# Patient Record
Sex: MALE | Race: WHITE | Employment: OTHER | URBAN - METROPOLITAN AREA
[De-identification: names, ages, dates, MRNs, and addresses within clinical notes are randomized per-mention and may not be internally consistent; named-entity substitution may affect disease eponyms.]

---

## 2017-08-06 ENCOUNTER — HOSPITAL ENCOUNTER (EMERGENCY)
Age: 42
Discharge: HOME | End: 2017-08-06
Attending: INTERNAL MEDICINE | Admitting: INTERNAL MEDICINE
Payer: OTHER GOVERNMENT

## 2017-08-06 ENCOUNTER — APPOINTMENT (OUTPATIENT)
Dept: GENERAL RADIOLOGY | Age: 42
End: 2017-08-06
Attending: INTERNAL MEDICINE
Payer: OTHER GOVERNMENT

## 2017-08-06 VITALS
HEART RATE: 87 BPM | RESPIRATION RATE: 20 BRPM | TEMPERATURE: 98 F | BODY MASS INDEX: 28.7 KG/M2 | OXYGEN SATURATION: 97 % | WEIGHT: 205 LBS | HEIGHT: 71 IN | DIASTOLIC BLOOD PRESSURE: 86 MMHG | SYSTOLIC BLOOD PRESSURE: 145 MMHG

## 2017-08-06 DIAGNOSIS — M25.511 ACUTE PAIN OF RIGHT SHOULDER: ICD-10-CM

## 2017-08-06 DIAGNOSIS — S49.91XA RIGHT SHOULDER INJURY, INITIAL ENCOUNTER: Primary | ICD-10-CM

## 2017-08-06 PROCEDURE — 74011250637 HC RX REV CODE- 250/637: Performed by: PHYSICIAN ASSISTANT

## 2017-08-06 PROCEDURE — 96372 THER/PROPH/DIAG INJ SC/IM: CPT

## 2017-08-06 PROCEDURE — 72040 X-RAY EXAM NECK SPINE 2-3 VW: CPT

## 2017-08-06 PROCEDURE — 99283 EMERGENCY DEPT VISIT LOW MDM: CPT

## 2017-08-06 PROCEDURE — 74011250636 HC RX REV CODE- 250/636: Performed by: PHYSICIAN ASSISTANT

## 2017-08-06 PROCEDURE — 73030 X-RAY EXAM OF SHOULDER: CPT

## 2017-08-06 PROCEDURE — L3670 SO ACRO/CLAV CAN WEB PRE OTS: HCPCS

## 2017-08-06 RX ORDER — HYDROCODONE BITARTRATE AND ACETAMINOPHEN 10; 325 MG/1; MG/1
TABLET ORAL
Qty: 20 TAB | Refills: 0 | Status: SHIPPED | OUTPATIENT
Start: 2017-08-06

## 2017-08-06 RX ORDER — MORPHINE SULFATE 2 MG/ML
2 INJECTION, SOLUTION INTRAMUSCULAR; INTRAVENOUS
Status: COMPLETED | OUTPATIENT
Start: 2017-08-06 | End: 2017-08-06

## 2017-08-06 RX ORDER — ONDANSETRON 4 MG/1
4 TABLET, ORALLY DISINTEGRATING ORAL
Status: COMPLETED | OUTPATIENT
Start: 2017-08-06 | End: 2017-08-06

## 2017-08-06 RX ADMIN — MORPHINE SULFATE 2 MG: 2 INJECTION, SOLUTION INTRAMUSCULAR; INTRAVENOUS at 14:38

## 2017-08-06 RX ADMIN — ONDANSETRON 4 MG: 4 TABLET, ORALLY DISINTEGRATING ORAL at 14:01

## 2017-08-06 NOTE — DISCHARGE INSTRUCTIONS
Shoulder Pain: Care Instructions  Your Care Instructions    You can hurt your shoulder by using it too much during an activity, such as fishing or baseball. It can also happen as part of the everyday wear and tear of getting older. Shoulder injuries can be slow to heal, but your shoulder should get better with time. Your doctor may recommend a sling to rest your shoulder. If you have injured your shoulder, you may need testing and treatment. Follow-up care is a key part of your treatment and safety. Be sure to make and go to all appointments, and call your doctor if you are having problems. It's also a good idea to know your test results and keep a list of the medicines you take. How can you care for yourself at home? · Take pain medicines exactly as directed. ¨ If the doctor gave you a prescription medicine for pain, take it as prescribed. ¨ If you are not taking a prescription pain medicine, ask your doctor if you can take an over-the-counter medicine. ¨ Do not take two or more pain medicines at the same time unless the doctor told you to. Many pain medicines contain acetaminophen, which is Tylenol. Too much acetaminophen (Tylenol) can be harmful. · If your doctor recommends that you wear a sling, use it as directed. Do not take it off before your doctor tells you to. · Put ice or a cold pack on the sore area for 10 to 20 minutes at a time. Put a thin cloth between the ice and your skin. · If there is no swelling, you can put moist heat, a heating pad, or a warm cloth on your shoulder. Some doctors suggest alternating between hot and cold. · Rest your shoulder for a few days. If your doctor recommends it, you can then begin gentle exercise of the shoulder, but do not lift anything heavy. When should you call for help? Call 911 anytime you think you may need emergency care. For example, call if:  · You have chest pain or pressure. This may occur with:  ¨ Sweating. ¨ Shortness of breath.   ¨ Nausea or vomiting. ¨ Pain that spreads from the chest to the neck, jaw, or one or both shoulders or arms. ¨ Dizziness or lightheadedness. ¨ A fast or uneven pulse. After calling 911, chew 1 adult-strength aspirin. Wait for an ambulance. Do not try to drive yourself. · Your arm or hand is cool or pale or changes color. Call your doctor now or seek immediate medical care if:  · You have signs of infection, such as:  ¨ Increased pain, swelling, warmth, or redness in your shoulder. ¨ Red streaks leading from a place on your shoulder. ¨ Pus draining from an area of your shoulder. ¨ Swollen lymph nodes in your neck, armpits, or groin. ¨ A fever. Watch closely for changes in your health, and be sure to contact your doctor if:  · You cannot use your shoulder. · Your shoulder does not get better as expected. Where can you learn more? Go to http://tom-bordy.info/. Enter Y469 in the search box to learn more about \"Shoulder Pain: Care Instructions. \"  Current as of: March 21, 2017  Content Version: 11.3  © 7541-8201 [employer]. Care instructions adapted under license by [employer] (which disclaims liability or warranty for this information). If you have questions about a medical condition or this instruction, always ask your healthcare professional. [address] any warranty or liability for your use of this information.

## 2017-08-06 NOTE — ED PROVIDER NOTES
[address]  EMERGENCY DEPARTMENT HISTORY AND PHYSICAL EXAM       Date: 8/6/2017   Patient Name: Adelia Mullins   YOB: 1975  Medical Record Number: [id number]    History of Presenting Illness     Chief Complaint   Patient presents with    Fall    Shoulder Injury        History Provided By:  patient    Additional History: 1:56 PM  Adelia Mullins is a 46 y.o. male presenting to the ED C/O throbbing right shoulder pain radiating down his arm s/p fall onset 1 hour ago. Associated sxs include right shoulder tingling and numbness. Pt states he fell with his right arm extended in front of him and when he landed on it he heard a \"pop\". Pt denies any other symptoms or complaints at this time. Primary Care Provider: Codi Smith MD   Specialist:    Past History     Past Medical History:   History reviewed. No pertinent past medical history. Past Surgical History:   Past Surgical History:   Procedure Laterality Date    HX ORTHOPAEDIC      ankle repair        Family History:   History reviewed. No pertinent family history. Social History:   Social History   Substance Use Topics    Smoking status: Never Smoker    Smokeless tobacco: None    Alcohol use Yes        Allergies: Allergies   Allergen Reactions    Avocado Hives    Shellfish Derived Hives        Review of Systems   Review of Systems   Constitutional: Negative for chills and fever. Musculoskeletal: Positive for arthralgias (right shoulder). Neurological: Positive for weakness (due to pain) and numbness (tingling). All other systems reviewed and are negative. Physical Exam  There were no vitals filed for this visit. Physical Exam   Constitutional: He is oriented to person, place, and time. Vital signs are normal. He appears well-developed and well-nourished. No distress. HENT:   Head: Normocephalic and atraumatic.    Eyes: Conjunctivae and EOM are normal. Pupils are equal, round, and reactive to light.   Neck: Normal range of motion. Neck supple. Cardiovascular: Normal rate, regular rhythm and normal heart sounds. Pulmonary/Chest: Effort normal and breath sounds normal. No respiratory distress. Abdominal: Soft. Bowel sounds are normal.   Musculoskeletal:        Right shoulder: He exhibits decreased range of motion, tenderness and bony tenderness (right upper scapula). He exhibits no deformity.  strength is normal and he does have a radial pulse. Full strength although limited by pain. Neurological: He is alert and oriented to person, place, and time. Skin: Skin is warm and dry. He is not diaphoretic. Psychiatric: He has a normal mood and affect. His behavior is normal.   Nursing note and vitals reviewed. Diagnostic Study Results     Labs -    No results found for this or any previous visit (from the past 12 hour(s)). Radiologic Studies -  The following have been ordered and reviewed:  XR SPINE CERV TRAUMA 3 V MAX   Final Result   IMPRESSION:  1. Mild cervical spondylosis as above without evidence of acute malalignment or  abnormal prevertebral soft tissue swelling  As read by the radiologist.     XR SHOULDER RT AP/LAT MIN 2 V   Final Result   IMPRESSION:  1. No acute osseous abnormality involving the right shoulder. As read by the radiologist.       Medical Decision Making   I am the first provider for this patient. I reviewed the vital signs, available nursing notes, past medical history, past surgical history, family history and social history. Vital Signs-Reviewed the patient's vital signs. No data found. Pulse Oximetry Analysis - Normal 100% on RA         Old Medical Records: Nursing notes. Procedures:   Procedures    ED Course:  1:56 PM  Initial assessment performed. The patients presenting problems have been discussed, and they are in agreement with the care plan formulated and outlined with them.   I have encouraged them to ask questions as they arise throughout their visit. Medications Given in the ED:  Medications   morphine injection 2 mg (2 mg IntraMUSCular Given 8/6/17 2838)   ondansetron (ZOFRAN ODT) tablet 4 mg (4 mg Oral Given 8/6/17 1401)       Discharge Note:  3:39 PM  Pt has been reexamined. Patient has no new complaints, changes, or physical findings. Care plan outlined and precautions discussed. Results were reviewed with the patient. All medications were reviewed with the patient; will d/c home with [facility] for pain. All of pt's questions and concerns were addressed. Patient was instructed and agrees to follow up with [facility], as well as to return to the ED upon further deterioration. Patient is ready to go home. Diagnosis   Clinical Impression:   1. Right shoulder injury, initial encounter    2. Acute pain of right shoulder           Follow-up Information     Follow up With Details Comments [address] Schedule an appointment as soon as possible for a visit in 2 days For orthopedic follow up [facility] [address] [place] [address]    [facility] EMERGENCY DEPT Go to As needed, If symptoms worsen [address] [place] [address]  [phone]          Current Discharge Medication List      START taking these medications    Details   HYDROcodone-acetaminophen (NORCO)  mg tablet Take half to one tab by mouth PRN pain. Qty: 20 Tab, Refills: 0             _______________________________   Attestations: This note is prepared by Princess Woo, acting as a Scribe for [employer], PA-C. on 1:49 PM on 8/6/2017 . [employer], PA-C.: The scribe's documentation has been prepared under my direction and personally reviewed by me in its entirety.   _______________________________

## 2017-08-06 NOTE — LETTER
[facility] [place] 
[facility] 
[address] [id number] 
[phone] Work/School Note Date: 8/6/2017 To Whom It May concern: 
 
Clara Stokes was seen and treated today in the emergency room by the following provider(s): 
Attending Provider: Caprice Moreira MD 
Physician Assistant: Marina Carvajal. Clara Stokes was seen here today. Please excuse from work tomorrow (8/7/17). He will need to wear right arm sling and follow up with orthopedics for further care/instruction. Sincerely, Marylen Sees, PA-C.

## 2017-08-06 NOTE — ED TRIAGE NOTES
States fell with arm extending in front c/o R shoulder pain and numbness. +R radial pulse. Sepsis Screening completed    (  )Patient meets SIRS criteria. ( x )Patient does not meet SIRS criteria.       SIRS Criteria is achieved when two or more of the following are present   Temperature < 96.8°F (36°C) or > 100.9°F (38.3°C)   Heart Rate > 90 beats per minute   Respiratory Rate > 20 breaths per minute   WBC count > 12,000 or <4,000 or > 10% bands